# Patient Record
Sex: FEMALE | Race: WHITE | NOT HISPANIC OR LATINO | URBAN - METROPOLITAN AREA
[De-identification: names, ages, dates, MRNs, and addresses within clinical notes are randomized per-mention and may not be internally consistent; named-entity substitution may affect disease eponyms.]

---

## 2020-07-29 ENCOUNTER — TELEPHONE (OUTPATIENT)
Dept: PSYCHIATRY | Facility: CLINIC | Age: 25
End: 2020-07-29

## 2023-02-10 ENCOUNTER — TELEPHONE (OUTPATIENT)
Dept: OBGYN CLINIC | Facility: CLINIC | Age: 28
End: 2023-02-10

## 2023-02-23 ENCOUNTER — HOSPITAL ENCOUNTER (EMERGENCY)
Facility: HOSPITAL | Age: 28
Discharge: HOME | End: 2023-02-23
Attending: EMERGENCY MEDICINE

## 2023-02-23 ENCOUNTER — APPOINTMENT (EMERGENCY)
Dept: RADIOLOGY | Facility: HOSPITAL | Age: 28
End: 2023-02-23

## 2023-02-23 VITALS
DIASTOLIC BLOOD PRESSURE: 78 MMHG | OXYGEN SATURATION: 98 % | RESPIRATION RATE: 16 BRPM | HEART RATE: 104 BPM | SYSTOLIC BLOOD PRESSURE: 126 MMHG | TEMPERATURE: 98.1 F

## 2023-02-23 DIAGNOSIS — S93.402A LEFT ANKLE SPRAIN: Primary | ICD-10-CM

## 2023-02-23 LAB
DME PARACHUTE DELIVERY DATE ACTUAL: NORMAL
DME PARACHUTE DELIVERY DATE REQUESTED: NORMAL
DME PARACHUTE ITEM DESCRIPTION: NORMAL
DME PARACHUTE ORDER STATUS: NORMAL
DME PARACHUTE SUPPLIER NAME: NORMAL
DME PARACHUTE SUPPLIER PHONE: NORMAL

## 2023-02-23 RX ORDER — IBUPROFEN 400 MG/1
400 TABLET ORAL ONCE
Status: COMPLETED | OUTPATIENT
Start: 2023-02-23 | End: 2023-02-23

## 2023-02-23 RX ADMIN — IBUPROFEN 400 MG: 400 TABLET ORAL at 19:14

## 2023-02-24 NOTE — ED PROVIDER NOTES
History  Chief Complaint   Patient presents with   • Ankle Pain     Pt was walking to get to work and tripped and twisted her left ankle  Pt c/o difficulty bearing weight and swelling  79-year-old female is presenting with left ankle pain  Patient was on her way to work this morning whenever she tripped on the sidewalk causing her to invert her left ankle underneath her left lower extremity  Patient states that immediately afterwards she was having significant pain of her left foot and was only able to bear weight for a short time  Since the incident, she has noticed significant swelling of the left foot especially on the dorsum of the foot near the lateral edge  She is having tenderness mostly along the lateral edge of the foot near the fifth metatarsal   She denies any prior injuries to the left lower extremity, no prior surgeries to the extremity  She denies any other health complaints at this time  None       History reviewed  No pertinent past medical history  History reviewed  No pertinent surgical history  History reviewed  No pertinent family history  I have reviewed and agree with the history as documented  E-Cigarette/Vaping     E-Cigarette/Vaping Substances     Social History     Tobacco Use   • Smoking status: Never   • Smokeless tobacco: Never   Substance Use Topics   • Alcohol use: Not Currently   • Drug use: Never        Review of Systems   Constitutional: Negative for chills and fever  HENT: Negative for ear pain and sore throat  Eyes: Negative for pain and visual disturbance  Respiratory: Negative for cough and shortness of breath  Cardiovascular: Negative for chest pain and palpitations  Gastrointestinal: Negative for abdominal pain and vomiting  Genitourinary: Negative for dysuria and hematuria  Musculoskeletal: Positive for arthralgias and gait problem  Negative for back pain  All other systems reviewed and are negative        Physical Exam  ED Triage Vitals [02/23/23 1737]   Temperature Pulse Respirations Blood Pressure SpO2   98 1 °F (36 7 °C) 104 16 126/78 98 %      Temp Source Heart Rate Source Patient Position - Orthostatic VS BP Location FiO2 (%)   Temporal Monitor Sitting Right arm --      Pain Score       10 - Worst Possible Pain             Orthostatic Vital Signs  Vitals:    02/23/23 1737   BP: 126/78   Pulse: 104   Patient Position - Orthostatic VS: Sitting       Physical Exam    ED Medications  Medications   ibuprofen (MOTRIN) tablet 400 mg (has no administration in time range)       Diagnostic Studies  Results Reviewed     None                 XR ankle 3+ views LEFT    (Results Pending)   XR foot 3+ views LEFT    (Results Pending)         Procedures  Procedures      ED Course                                       MDM      Disposition  Final diagnoses:   None     ED Disposition     None      Follow-up Information    None         Patient's Medications    No medications on file     No discharge procedures on file  PDMP Review     None           ED Provider  Attending physically available and evaluated Ashley Harvey  LIZ managed the patient along with the ED Attending      Electronically Signed by Refill: Capillary refill takes less than 2 seconds  Coloration: Skin is not jaundiced  Findings: No rash  Neurological:      General: No focal deficit present  Mental Status: She is alert and oriented to person, place, and time  Mental status is at baseline  Psychiatric:         Mood and Affect: Mood normal          Behavior: Behavior normal          ED Medications  Medications   ibuprofen (MOTRIN) tablet 400 mg (400 mg Oral Given 2/23/23 1914)       Diagnostic Studies  Results Reviewed     None                 XR ankle 3+ views LEFT   Final Result by Clinton West MD (02/24 4167)      No acute osseous abnormality  Workstation performed: [id number]         XR foot 3+ views LEFT   Final Result by Clinton West MD (02/24 9692)      No acute osseous abnormality  Workstation performed: [id number]               Procedures  Procedures      ED Course                                       Medical Decision Making  22-year-old female presenting with left ankle and foot pain  Inversion type injury early this morning on the way to work  Patient has been able to bear weight though she states it is extremely painful now  Has pain in fifth metatarsal base and near midfoot but anterior to navicular  Will evaluate further with x-ray of the left foot due to fifth metatarsal pain as well as left ankle due to significant pain with ambulation  Reassessment/disposition: No fractures evident on plain films  Most likely diagnosis is left ankle sprain  Patient was placed in a walking boot due to the fact that she needs to ambulate with her stroller in order to get home  Patient will follow-up with orthopedic surgery team due to her left ankle sprain with significant pain and difficulty with ambulation  Patient was observed ambulating with walking boot without much difficulty, was able to push stroller safely  Patient was given referral to orthopedic surgery    Patient was told to come immediately back to the emergency department if she has concerns for any numbness, tingling, or inability to walk  She expressed understanding of the return precautions and otherwise agrees to follow-up with orthopedics and her family doctor  Left ankle sprain: acute illness or injury  Amount and/or Complexity of Data Reviewed  Radiology: ordered  Risk  Prescription drug management  Disposition  Final diagnoses:   Left ankle sprain     Time reflects when diagnosis was documented in both MDM as applicable and the Disposition within this note     Time User Action Codes Description Comment    2/23/2023  7:37 PM Noble Marleensuzanne Add [[id number] [id number]] Left ankle sprain       ED Disposition     ED Disposition   Discharge    Condition   Stable    Date/Time   Thu Feb 23, 2023  7:37 PM    Comment   Mireya Donovan discharge to home/self care  Follow-up Information     Follow up With Specialties Details Why Contact Info Additional [address] Emergency Department Emergency Medicine Go to  If symptoms worsen, As needed [address] [phone]  958 [facility] 64 [facility] Emergency Department, [address], [address]    [address] Orthopedic Surgery   [address] [phone]  [phone] [address], [address], [address]  Use Entrance A           There are no discharge medications for this patient  PDMP Review     None           ED Provider  Attending physically available and evaluated Mireya Donovan  I managed the patient along with the ED Attending      Electronically Signed by         Ag Ruiz MD  02/28/23 5969

## 2023-02-24 NOTE — ED ATTENDING ATTESTATION
2/23/2023  IGopi DO, saw and evaluated the patient  I have discussed the patient with the resident/non-physician practitioner and agree with the resident's/non-physician practitioner's findings, Plan of Care, and MDM as documented in the resident's/non-physician practitioner's note, except where noted  All available labs and Radiology studies were reviewed  I was present for key portions of any procedure(s) performed by the resident/non-physician practitioner and I was immediately available to provide assistance  At this point I agree with the current assessment done in the Emergency Department  I have conducted an independent evaluation of this patient a history and physical is as follows:    27-year-old female presents with left ankle pain  Patient was on her way to work this morning and tripped on the sidewalk causing an inversion injury of her left ankle  Patient states she is having difficulty walking and bearing weight on that foot all day  Denies other complaints or injuries  On exam-no acute distress, heart regular, no respiratory distress, tenderness on the base of the fifth metatarsal and on the dorsum of the foot, no tenderness on the lateral malleolus, distally neurovascular intact  Plan- x-ray left ankle and foot    Personally reviewed x-rays and no obvious fracture    We will do Aircast or walking boot and follow-up    ED Course         Critical Care Time  Procedures

## 2023-02-24 NOTE — DISCHARGE INSTRUCTIONS
You have an ankle sprain  I am putting you in a walking boot  You will need to follow-up with orthopedic surgery  Please treat your pain at home with Tylenol/Motrin, keep weightbearing to limited amounts until seen by orthopedic surgery  You will need to follow-up with orthopedic surgery as an outpatient due to the fact that if your pain is not improving you may need an MRI or even surgery at some point  Please note that while we did not see an acute fracture on your x-ray, occasionally radiology reviewed who looks at these films overnight may find a small fracture that we did not see overnight  If this was the case then they will call you with instructions for how to proceed if it changes management

## 2023-02-25 ENCOUNTER — OFFICE VISIT (OUTPATIENT)
Dept: OBGYN CLINIC | Facility: CLINIC | Age: 28
End: 2023-02-25

## 2023-02-25 VITALS
HEART RATE: 79 BPM | WEIGHT: 198 LBS | DIASTOLIC BLOOD PRESSURE: 78 MMHG | HEIGHT: 62 IN | BODY MASS INDEX: 36.44 KG/M2 | SYSTOLIC BLOOD PRESSURE: 148 MMHG

## 2023-02-25 DIAGNOSIS — M25.572 ACUTE LEFT ANKLE PAIN: Primary | ICD-10-CM

## 2023-02-25 DIAGNOSIS — M25.672 STIFFNESS OF LEFT ANKLE JOINT: ICD-10-CM

## 2023-02-25 DIAGNOSIS — S93.402A LEFT ANKLE SPRAIN: ICD-10-CM

## 2023-02-25 DIAGNOSIS — S93.492A SPRAIN OF ANTERIOR TALOFIBULAR LIGAMENT OF LEFT ANKLE, INITIAL ENCOUNTER: ICD-10-CM

## 2023-02-25 DIAGNOSIS — R29.898 ANKLE WEAKNESS: ICD-10-CM

## 2023-02-25 NOTE — LETTER
February 25, 2023     Patient: Nasir Alexandre  YOB: 1995  Date of Visit: 2/25/2023      To Whom it May Concern:    Nasir Alexandre is under my professional care  Tony Cain was seen in my office on 2/25/2023  Tony Cain may return to work with limitations :  no climbing  Sit as needed  Please allow to wear boot as needed  If no light duty is available, patient would be out of work till next evaluation  If you have any questions or concerns, please don't hesitate to call           Sincerely,          Dick Friend NEIL Linares        CC: Nasir Alexandre

## 2023-02-25 NOTE — PATIENT INSTRUCTIONS
P  R I C E  Treatment   WHAT YOU NEED TO KNOW:   What is P R I C E  treatment? P R I C E  treatment is a 5-step process used to decrease swelling and pain caused by an injury  P R I C E  stands for protect, rest, ice, compress, and elevate  Start P R I C E  within 24 to 48 hours of an injury  How do I use P R I C E  treatment? Protect  your injury from more damage  Support the injured area with a brace or splint  Your healthcare provider will tell you how long to use the brace or splint  Rest  your injured area as directed  You may need to stop using, or keep weight off, the injury for 48 hours or longer  Your healthcare provider may recommend crutches or another device  Return to your usual activities as directed  Apply ice  on your injured area for 15 to 20 minutes every 4 hours or as directed  Use an ice pack, or put crushed ice in a plastic bag  Cover the bag with a towel before you apply it to your skin  Ice helps prevent tissue damage and decreases swelling and pain  Compress  (keep pressure on) the injured area  Compression will help decrease swelling and support the injured area  Use an elastic bandage, air stirrup, splint, or sling as directed  If you use an elastic bandage, make sure the bandage is not too tight  You should be able to slip 2 fingers between the bandage and your skin  Elevate  the injured area above the level of your heart as often as you can  This will help decrease swelling and pain  Prop the injured area on pillows or blankets to keep it elevated comfortably  When should I seek immediate care? Your pain is severe  You have severe swelling or deformity  You have numbness in the injured area  When should I call my doctor? Your pain and swelling do not go away after a few days  You have questions or concerns about your condition or care  CARE AGREEMENT:   You have the right to help plan your care   Learn about your health condition and how it may be treated  Discuss treatment options with your healthcare providers to decide what care you want to receive  You always have the right to refuse treatment  The above information is an  only  It is not intended as medical advice for individual conditions or treatments  Talk to your doctor, nurse or pharmacist before following any medical regimen to see if it is safe and effective for you  © Copyright [facility] 2022 Information is for End User's use only and may not be sold, redistributed or otherwise used for commercial purposes  Ankle Sprain   WHAT YOU NEED TO KNOW:   An ankle sprain happens when 1 or more ligaments in your ankle joint stretch or tear  Ligaments are tough tissues that connect bones  Ligaments support your joints and keep your bones in place  DISCHARGE INSTRUCTIONS:   Return to the emergency department if:   You have severe pain in your ankle  Your foot or toes are cold or numb  Your ankle becomes more weak or unstable (wobbly)  You are unable to put any weight on your ankle or foot  Your swelling has increased or returned  Call your doctor if:   Your pain does not go away, even after treatment  You have questions or concerns about your condition or care  Medicines: You may need any of the following:  NSAIDs , such as ibuprofen, help decrease swelling, pain, and fever  This medicine is available with or without a doctor's order  NSAIDs can cause stomach bleeding or kidney problems in certain people  If you take blood thinner medicine, always ask your healthcare provider if NSAIDs are safe for you  Always read the medicine label and follow directions  Acetaminophen  decreases pain and fever  It is available without a doctor's order  Ask how much to take and how often to take it  Follow directions   Read the labels of all other medicines you are using to see if they also contain acetaminophen, or ask your doctor or pharmacist  Acetaminophen can cause liver damage if not taken correctly  Prescription pain medicine  may be given  Ask your healthcare provider how to take this medicine safely  Some prescription pain medicines contain acetaminophen  Do not take other medicines that contain acetaminophen without talking to your healthcare provider  Too much acetaminophen may cause liver damage  Prescription pain medicine may cause constipation  Ask your healthcare provider how to prevent or treat constipation  Take your medicine as directed  Contact your healthcare provider if you think your medicine is not helping or if you have side effects  Tell your provider if you are allergic to any medicine  Keep a list of the medicines, vitamins, and herbs you take  Include the amounts, and when and why you take them  Bring the list or the pill bottles to follow-up visits  Carry your medicine list with you in case of an emergency  Self-care:   Use support devices , such as a brace, cast, or splint, to limit your movement and protect your joint  You may need to use crutches to decrease your pain as you move around  Go to physical therapy  as directed  A physical therapist teaches you exercises to help improve movement and strength, and to decrease pain  Rest  your ankle so that it can heal  Return to normal activities as directed  Apply ice  on your ankle for 15 to 20 minutes every hour or as directed  Use an ice pack, or put crushed ice in a plastic bag  Cover the ice pack or bag with a towel before you put it on your injury  Ice helps prevent tissue damage and decreases swelling and pain  Compress  your ankle  Ask if you should wrap an elastic bandage around your injured ligament  An elastic bandage provides support and helps decrease swelling and movement so your joint can heal  Wear as long as directed  Elevate  your ankle above the level of your heart as often as you can  This will help decrease swelling and pain   Prop your ankle on pillows or blankets to keep it elevated comfortably  Prevent another ankle sprain:   Let your ankle heal   Find out how long your ligament needs to heal  Do not do any physical activity until your healthcare provider says it is okay  If you start activity too soon, you may develop a more serious injury  Warm up and stretch before you exercise or play sports  This helps your joints become strong and flexible  Use the right equipment  Always wear shoes that fit well and are made for the activity that you are doing  You may also need ankle supports, elbow and knee pads, or braces  Follow up with your doctor as directed:  Write down your questions so you remember to ask them during your visits  © Copyright \[facility]\"" [employer] 2022 Information is for End User's use only and may not be sold, redistributed or otherwise used for commercial purposes  The above information is an  only  It is not intended as medical advice for individual conditions or treatments  Talk to your doctor, nurse or pharmacist before following any medical regimen to see if it is safe and effective for you

## 2023-02-25 NOTE — PROGRESS NOTES
Orthopaedic Surgery - Office Note  Carline Jacobo (6 y o  female)   : 1995   MRN: [id number]  Encounter Date: 2023    Chief Complaint   Patient presents with   • Left Ankle - Pain         Assessment/Plan  Diagnoses and all orders for this visit:    Acute left ankle pain  -     Ambulatory Referral to Physical Therapy; Future    Sprain of anterior talofibular ligament of left ankle, initial encounter  -     Ambulatory Referral to Physical Therapy; Future    Stiffness of left ankle joint  -     Ambulatory Referral to Physical Therapy; Future    Ankle weakness  -     Ambulatory Referral to Physical Therapy; Future    Left ankle sprain  -     Ambulatory referral to Orthopedic Surgery    Alexandra Ross sustained an ankle sprain on 2023  She is currently tolerating the boot quite well and may continue use of the boot progressing to a regular shoe as tolerated  She will ice the ankle for 20 minutes on 1 hour off 3 times a day  While utilizing the boot she will use aspirin 81 mg 1 tablet twice daily with food stopping and calling if any stomach upset occurs  She will be started in formal physical therapy  As she is not limping she does not need to utilize any crutches  She will be provided a light duty restriction for work  Return for Recheck in 3/4 weeks with sports medicine (non operative)  History of Present Illness  This is a new patient who injured her left ankle while walking to work on 2023  Patient describes an inversion injury with immediate pain and discomfort  She was seen at the emergency department and had foot and ankle x-rays which were negative for fracture  Patient was given a boot and presents today for evaluation and treatment  Patient denies previous problems with the ankle  Patient reports she does not have any significant pain while utilizing the boot  Most of the discomfort is in the lateral aspect of the ankle  No paresthesias or calf pain are noted  She denies any contributing medical history    Review of Systems  Pertinent items are noted in HPI  All other systems were reviewed and are negative  Physical Exam  /78 (BP Location: Right arm, Patient Position: Sitting, Cuff Size: Standard)   Pulse 79   Ht 5' 2" (1 575 m)   Wt 89 8 kg (198 lb)   LMP 02/16/2023   BMI 36 21 kg/m²   Cons: Appears well  No apparent distress  Psych: Alert  Oriented x3  Mood and affect normal   Eyes: PERRLA, EOMI  Resp: Normal effort  No audible wheezing or stridor  CV: Palpable pulse  No discernable arrhythmia  Lymph:  No palpable cervical, axillary, or inguinal lymphadenopathy  Skin: Warm  No palpable masses  No visible lesions  Neuro: Normal muscle tone  Normal and symmetric DTR's  Patient's left ankle was without skin breakdown lesion or signs of infection  There is mild tenderness over the ATFL  She is nontender over the lateral malleolus  She is nontender at the fifth metatarsal   She is nontender at the medial malleolus, deltoid, CFL, and midfoot  She has well-maintained ankle dorsiflexion plantarflexion inversion and eversion but has 4 out of 5 strength in inversion and eversion  She has no instability with anterior drawer  There is no calf tenderness and a negative Homans  Gait is heel-to-toe in the boot and is non-antalgic    Dorsalis pedis and posterior tibial pulses are +2          Studies Reviewed  Study Result    Narrative & Impression   LEFT ANKLE     INDICATION:   twisted ankle, pain and swelling      COMPARISON:  None     VIEWS:  XR ANKLE 3+ VW LEFT   Images: 3     FINDINGS:     There is no acute fracture or dislocation      No significant degenerative changes      No lytic or blastic osseous lesion      Soft tissues are unremarkable      IMPRESSION:     No acute osseous abnormality            Workstation performed: [id number]     Study Result    Narrative & Impression   LEFT FOOT     INDICATION:   inversion injury with 5th metatarsal pain      COMPARISON:  None     VIEWS:  XR FOOT 3+ VW LEFT         FINDINGS:     There is no acute fracture or dislocation      No significant degenerative changes      No lytic or blastic osseous lesion      Soft tissues are unremarkable      IMPRESSION:     No acute osseous abnormality            Workstation performed: [id number]     X-ray images as well as report reviewed by myself in the office today and I agree with radiologist interpretation  Emergency department notes from 2/23/2023 were reviewed by myself in the office today  Procedures  No procedures today  Medical, Surgical, Family, and Social History  The patient's medical history, family history, and social history, were reviewed and updated as appropriate  History reviewed  No pertinent past medical history  History reviewed  No pertinent surgical history  History reviewed  No pertinent family history  Social History     Occupational History   • Not on file   Tobacco Use   • Smoking status: Never   • Smokeless tobacco: Never   Substance and Sexual Activity   • Alcohol use: Not Currently   • Drug use: Never   • Sexual activity: Not on file       No Known Allergies    No current outpatient medications on file        Abelardo Ricci PA-C

## 2023-02-27 ENCOUNTER — HOSPITAL ENCOUNTER (EMERGENCY)
Facility: HOSPITAL | Age: 28
Discharge: HOME | End: 2023-02-27
Attending: EMERGENCY MEDICINE

## 2023-02-27 VITALS
HEART RATE: 105 BPM | SYSTOLIC BLOOD PRESSURE: 134 MMHG | DIASTOLIC BLOOD PRESSURE: 68 MMHG | RESPIRATION RATE: 20 BRPM | OXYGEN SATURATION: 98 % | TEMPERATURE: 97.2 F

## 2023-02-27 DIAGNOSIS — S93.602D SPRAIN OF LEFT FOOT, SUBSEQUENT ENCOUNTER: Primary | ICD-10-CM

## 2023-02-27 NOTE — ED PROVIDER NOTES
History  Chief Complaint   Patient presents with   • Foot Pain     Patient tore ligaments in her left foot  Is wearing a boot  Patient has been working and now pain is getting worse  27F who rolled ankle last week at work, presents to the ED due to continued discomfort  She was given a boot and xrays obtained with no findings of fracture  Saw ortho on Saturday who recommended PT, conservative management  Pain is worsening, frequently 9/10  Works in a , ortho recommended light duty but patient has continued to spend a lot of time on her feet  Advil and tylenol provide minimal relief  No recent further trauma  None       History reviewed  No pertinent past medical history  History reviewed  No pertinent surgical history  History reviewed  No pertinent family history  I have reviewed and agree with the history as documented  E-Cigarette/Vaping     E-Cigarette/Vaping Substances     Social History     Tobacco Use   • Smoking status: Never   • Smokeless tobacco: Never   Substance Use Topics   • Alcohol use: Not Currently   • Drug use: Never        Review of Systems   All other systems reviewed and are negative  Physical Exam  ED Triage Vitals [02/27/23 0753]   Temperature Pulse Respirations Blood Pressure SpO2   (!) 97 2 °F (36 2 °C) 105 20 134/68 98 %      Temp Source Heart Rate Source Patient Position - Orthostatic VS BP Location FiO2 (%)   Temporal Monitor Sitting Left arm --      Pain Score       10 - Worst Possible Pain             Orthostatic Vital Signs  Vitals:    02/27/23 0753   BP: 134/68   Pulse: 105   Patient Position - Orthostatic VS: Sitting       Physical Exam  Vitals and nursing note reviewed  Constitutional:       General: She is not in acute distress  Appearance: She is well-developed  HENT:      Head: Normocephalic and atraumatic        Right Ear: External ear normal       Left Ear: External ear normal    Eyes:      Conjunctiva/sclera: Conjunctivae normal  Cardiovascular:      Rate and Rhythm: Normal rate and regular rhythm  Heart sounds: No murmur heard  Pulmonary:      Effort: Pulmonary effort is normal  No respiratory distress  Breath sounds: Normal breath sounds  Abdominal:      Palpations: Abdomen is soft  Tenderness: There is no abdominal tenderness  Musculoskeletal:         General: Tenderness (mild tenderness at navicular and 5th metatarsal  ) present  No swelling  Cervical back: Neck supple  Skin:     General: Skin is warm and dry  Capillary Refill: Capillary refill takes less than 2 seconds  Neurological:      General: No focal deficit present  Mental Status: She is alert  Sensory: No sensory deficit  Motor: No weakness  Psychiatric:         Mood and Affect: Mood normal          ED Medications  Medications - No data to display    Diagnostic Studies  Results Reviewed     None                 No orders to display         Procedures  Procedures      ED Course                                       Medical Decision Making  24 y o F w/ recent ankle sprain presenting to the ED due to continued discomfort  Patient has not been following recommendations to decrease stress on foot as she notes continued normal work schedule and "constantly on feet"  Patient also admits to only intermittent OTC medications usage  Patient is ambulatory in exam room with no current changes to physical exam  Imaging would not change any management at this time  Offered patient crutches for comfort which she declined  Discussed importance of following orthopedics recommendations for reduced weight bearing to allow time for healing  Patient discharged with work note and return precautions       Sprain of left foot, subsequent encounter: acute illness or injury        Disposition  Final diagnoses:   Sprain of left foot, subsequent encounter     Time reflects when diagnosis was documented in both MDM as applicable and the Disposition within this note     Time User Action Codes Description Comment    2/27/2023  9:44 AM Aurelio Vieira Add [W42 602A] Foot sprain, left, initial encounter     2/27/2023  9:44 AM Don No Remove [I80 602A] Foot sprain, left, initial encounter     2/27/2023  9:44 AM Xochitl No Add [H69 912D] Sprain of left foot, subsequent encounter       ED Disposition     ED Disposition   Discharge    Condition   Stable    Date/Time   Mon Feb 27, 2023  9:44 AM    Comment   Jett Bolaños discharge to home/self care  Follow-up Information    None         There are no discharge medications for this patient  No discharge procedures on file  PDMP Review     None           ED Provider  Attending physically available and evaluated Adry Rod I managed the patient along with the ED Attending      Electronically Signed by         Mitesh Pascual MD  03/01/23 0212

## 2023-02-27 NOTE — Clinical Note
Chapo Jacob was seen and treated in our emergency department on 2/27/2023  Diagnosis:     Amberlynn    She may return on this date: 03/04/2023    Patient needs to have minimal to no weight bearing on the left foot to allow time to heal for 5 days  If you have any questions or concerns, please don't hesitate to call        Iris Francis MD    ______________________________           _______________          _______________  Hospital Representative                              Date                                Time

## 2023-02-27 NOTE — CASE MANAGEMENT
Case Management ED Progress Note    Patient name Son Cardoza  Location ED 15/ED 15 MRN [id number]  : 1995 Date 2023        OBJECTIVE:  Predictive Model Details         53% Factor Value    Risk of Hospital Admission or ED Visit Model Number of ED Visits 2     Has Medicaid Yes     Is in Relationship No            Chief Complaint:   Patient Class: Emergency  Preferred Pharmacy:   UNKNOWN - FOLLOW UP PRIOR TO DISCHARGE TO E-PRESCRIBE  No address on file      [id number], [address] [place] [address]  Phone: [phone] Fax: [phone]    Primary Care Provider: No primary care provider on file  Primary Insurance: [address] [place] [address]  Secondary Insurance:     ED Progress Note:      CM Student met w/ Pt at bedside to provide resources on Primary Care Physician as one is not listed in Pts chart  Pt states that she does have a PCP through [facility] and is the process of transferring her care to [facility]  Pt denied PCP resources and states that she has no other needs at baseline

## 2023-02-27 NOTE — ED ATTENDING ATTESTATION
2/27/2023  ICarmel MD, saw and evaluated the patient  I have discussed the patient with the resident/non-physician practitioner and agree with the resident's/non-physician practitioner's findings, Plan of Care, and MDM as documented in the resident's/non-physician practitioner's note, except where noted  All available labs and Radiology studies were reviewed  I was present for key portions of any procedure(s) performed by the resident/non-physician practitioner and I was immediately available to provide assistance  At this point I agree with the current assessment done in the Emergency Department  I have conducted an independent evaluation of this patient a history and physical is as follows:    Patient presents to the emergency department with her child  The child is being evaluated in the patient asked to have her ankle checked at the same time  The patient was recently seen in the emergency department after an injury to the left ankle  The patient was seen by orthopedics on Saturday who agreed with conservative management with the walking boot and physical therapy  The patient had x-rays on February 23 of the ankle and foot  Both are read as having no osseous abnormalities  The patient reports she is been walking on the boot and continues to have pain in the proximal lateral foot  The patient has no other complaint  The patient is able to ambulate with discomfort  Physical exam demonstrates a female no acute distress  HEENT exam was normal   Lungs are clear with equal breath sounds  Heart had a regular rate rhythm  The abdomen is soft and nontender  Skin had no rash  There was no focal neurologic deficit  There was mild tenderness on the dorsal lateral left foot without any deformity, or crepitance  Remainder the foot and ankle exam was normal   Both lower extremities are neurovascular intact      ED Course         Critical Care Time  Procedures

## 2023-12-14 ENCOUNTER — APPOINTMENT (EMERGENCY)
Dept: RADIOLOGY | Facility: HOSPITAL | Age: 28
End: 2023-12-14
Payer: COMMERCIAL

## 2023-12-14 ENCOUNTER — HOSPITAL ENCOUNTER (EMERGENCY)
Facility: HOSPITAL | Age: 28
Discharge: HOME | End: 2023-12-14
Attending: EMERGENCY MEDICINE
Payer: COMMERCIAL

## 2023-12-14 VITALS
OXYGEN SATURATION: 96 % | TEMPERATURE: 97.4 F | HEART RATE: 109 BPM | DIASTOLIC BLOOD PRESSURE: 108 MMHG | RESPIRATION RATE: 18 BRPM | SYSTOLIC BLOOD PRESSURE: 163 MMHG

## 2023-12-14 DIAGNOSIS — U07.1 COVID: Primary | ICD-10-CM

## 2023-12-14 LAB
FLUAV RNA RESP QL NAA+PROBE: NEGATIVE
FLUBV RNA RESP QL NAA+PROBE: NEGATIVE
RSV RNA RESP QL NAA+PROBE: NEGATIVE
SARS-COV-2 RNA RESP QL NAA+PROBE: POSITIVE

## 2023-12-14 PROCEDURE — 99284 EMERGENCY DEPT VISIT MOD MDM: CPT | Performed by: PHYSICIAN ASSISTANT

## 2023-12-14 PROCEDURE — 71046 X-RAY EXAM CHEST 2 VIEWS: CPT

## 2023-12-14 PROCEDURE — 99284 EMERGENCY DEPT VISIT MOD MDM: CPT

## 2023-12-14 PROCEDURE — 0241U HB NFCT DS VIR RESP RNA 4 TRGT: CPT | Performed by: PHYSICIAN ASSISTANT

## 2023-12-14 NOTE — Clinical Note
Lisa Bentno was seen and treated in our emergency department on 12/14/2023. No restrictions            Diagnosis:     Jade Gracia  may return to work on return date. She may return on this date: 12/19/2023         If you have any questions or concerns, please don't hesitate to call.       Chey Pierce PA-C    ______________________________           _______________          _______________  Hospital Representative                              Date                                Time

## 2023-12-14 NOTE — ED PROVIDER NOTES
History  Chief Complaint   Patient presents with    Fever     Pt reports fever and vomiting    Cough     Cough and head pain for 2 weeks. Taking OTC meds. 61-year-old female presents emergency department with complaints of upper respiratory symptoms. States that she has had some nasal congestion with a cough over the past 1 to 2 weeks. She denies shortness of breath or difficulty breathing. No known fevers at home. Has had some intermittent nausea without vomiting states that her daughter is sick with similar symptoms but has not been sick as long. Denies other known sick contacts. History provided by:  Patient   used: No    Fever  Associated symptoms: congestion and cough    Associated symptoms: no chest pain, no ear pain, no fever, no headaches, no rash, no shortness of breath, no sore throat and no wheezing    Cough  Associated symptoms: no chest pain, no chills, no ear pain, no eye discharge, no fever, no headaches, no rash, no shortness of breath, no sore throat and no wheezing        None       History reviewed. No pertinent past medical history. History reviewed. No pertinent surgical history. History reviewed. No pertinent family history. I have reviewed and agree with the history as documented. E-Cigarette/Vaping     E-Cigarette/Vaping Substances     Social History     Tobacco Use    Smoking status: Never    Smokeless tobacco: Never   Substance Use Topics    Alcohol use: Not Currently    Drug use: Never       Review of Systems   Constitutional:  Negative for activity change, chills and fever. HENT:  Positive for congestion. Negative for dental problem, drooling, ear pain, mouth sores, sinus pressure, sneezing, sore throat, tinnitus and trouble swallowing. Eyes:  Negative for pain, discharge and itching. Respiratory:  Positive for cough. Negative for chest tightness, shortness of breath and wheezing. Cardiovascular:  Negative for chest pain.    Skin: Negative for rash. Neurological:  Negative for dizziness, light-headedness and headaches. All other systems reviewed and are negative. Physical Exam  Physical Exam  Vitals reviewed. Constitutional:       General: She is not in acute distress. Appearance: She is well-developed. She is not diaphoretic. HENT:      Head: Normocephalic and atraumatic. Right Ear: Hearing, tympanic membrane, ear canal and external ear normal.      Left Ear: Hearing, tympanic membrane, ear canal and external ear normal.      Nose: Nose normal.      Mouth/Throat:      Mouth: Mucous membranes are moist.      Pharynx: Uvula midline. Eyes:      General: Lids are normal.      Conjunctiva/sclera: Conjunctivae normal.   Cardiovascular:      Rate and Rhythm: Normal rate and regular rhythm. Heart sounds: No murmur heard. No friction rub. No gallop. Pulmonary:      Effort: Pulmonary effort is normal. No respiratory distress. Breath sounds: Decreased breath sounds present. No wheezing, rhonchi or rales. Chest:      Chest wall: No tenderness. Musculoskeletal:         General: Normal range of motion. Neurological:      Mental Status: She is alert and oriented to person, place, and time.    Psychiatric:         Mood and Affect: Mood normal.         Behavior: Behavior normal.         Vital Signs  ED Triage Vitals   Temperature Pulse Respirations Blood Pressure SpO2   12/14/23 1000 12/14/23 1001 12/14/23 1001 12/14/23 1001 12/14/23 1001   (!) 97.4 °F (36.3 °C) (!) 109 18 (!) 163/108 96 %      Temp Source Heart Rate Source Patient Position - Orthostatic VS BP Location FiO2 (%)   12/14/23 1000 -- -- -- --   Temporal          Pain Score       --                  Vitals:    12/14/23 1001   BP: (!) 163/108   Pulse: (!) 109         Visual Acuity      ED Medications  Medications - No data to display    Diagnostic Studies  Results Reviewed       Procedure Component Value Units Date/Time    FLU/RSV/COVID - if FLU/RSV clinically relevant [[id number]]  (Abnormal) Collected: 12/14/23 1057    Lab Status: Final result Specimen: Nares from Nose Updated: 12/14/23 1152     SARS-CoV-2 Positive     INFLUENZA A PCR Negative     INFLUENZA B PCR Negative     RSV PCR Negative    Narrative:      FOR PEDIATRIC PATIENTS - copy/paste COVID Guidelines URL to browser: [email or web address]    SARS-CoV-2 assay is a Nucleic Acid Amplification assay intended for the  qualitative detection of nucleic acid from SARS-CoV-2 in nasopharyngeal  swabs. Results are for the presumptive identification of SARS-CoV-2 RNA. Positive results are indicative of infection with SARS-CoV-2, the virus  causing COVID-19, but do not rule out bacterial infection or co-infection  with other viruses. Laboratories within the [facility] and its  territories are required to report all positive results to the appropriate  public health authorities. Negative results do not preclude SARS-CoV-2  infection and should not be used as the sole basis for treatment or other  patient management decisions. Negative results must be combined with  clinical observations, patient history, and epidemiological information. This test has not been FDA cleared or approved. This test has been authorized by FDA under an Emergency Use Authorization  (EUA). This test is only authorized for the duration of time the  declaration that circumstances exist justifying the authorization of the  emergency use of an in vitro diagnostic tests for detection of SARS-CoV-2  virus and/or diagnosis of COVID-19 infection under section 564(b)(1) of  the Act, 21 U. S.C. 933KCB-2(U)(8), unless the authorization is terminated  or revoked sooner. The test has been validated but independent review by FDA  and CLIA is pending. Test performed using [employer] GeneXpert: This RT-PCR assay targets N2,  a region unique to SARS-CoV-2.  A conserved region in the E-gene was chosen  for pan-Sarbecovirus detection which includes SARS-CoV-2. According to CMS-2020-01-R, this platform meets the definition of high-throughput technology. XR chest 2 views   ED Interpretation by Yesenia Pritchard PA-C (12/14 1206)   No focal consolidation                  Procedures  Procedures         ED Course                                             Medical Decision Making  Differential diagnosis includes but not limited to: COVID, influenza, pneumonia, RSV    Problems Addressed:  COVID: acute illness or injury    Amount and/or Complexity of Data Reviewed  Radiology: ordered and independent interpretation performed. Decision-making details documented in ED Course. Disposition  Final diagnoses:   COVID     Time reflects when diagnosis was documented in both MDM as applicable and the Disposition within this note       Time User Action Codes Description Comment    12/14/2023 12:06 PM [address] [U07.1] [facility]           ED Disposition       ED Disposition   Discharge    Condition   Stable    Date/Time   Thu Dec 14, 2023 12:06 PM    Comment   Jett Bolaños discharge to home/self care. Follow-up Information    None         There are no discharge medications for this patient. No discharge procedures on file.     PDMP Review       None            ED Provider  Electronically Signed by             Yesenia Pritchard PA-C  12/14/23 9745